# Patient Record
(demographics unavailable — no encounter records)

---

## 2025-01-09 NOTE — HEALTH RISK ASSESSMENT
[Yes] : Yes [2 - 3 times a week (3 pts)] : 2 - 3  times a week (3 points) [1 or 2 (0 pts)] : 1 or 2 (0 points) [Never (0 pts)] : Never (0 points) [No] : In the past 12 months have you used drugs other than those required for medical reasons? No [Former] : Former [0-4] : 0-4 [> 15 Years] : > 15 Years [With Family] : lives with family [] :  [Fully functional (bathing, dressing, toileting, transferring, walking, feeding)] : Fully functional (bathing, dressing, toileting, transferring, walking, feeding) [Fully functional (using the telephone, shopping, preparing meals, housekeeping, doing laundry, using] : Fully functional and needs no help or supervision to perform IADLs (using the telephone, shopping, preparing meals, housekeeping, doing laundry, using transportation, managing medications and managing finances) [Audit-CScore] : 3 [Employed] : employed [FreeTextEntry2] : .

## 2025-05-27 NOTE — HISTORY OF PRESENT ILLNESS
Admission
[de-identified] : Presenting for CPE. Feels well.  No specific concerns.  
[de-identified] : Presenting for CPE. Feels well.  No specific concerns.